# Patient Record
Sex: FEMALE | Race: WHITE | NOT HISPANIC OR LATINO | Employment: OTHER | URBAN - METROPOLITAN AREA
[De-identification: names, ages, dates, MRNs, and addresses within clinical notes are randomized per-mention and may not be internally consistent; named-entity substitution may affect disease eponyms.]

---

## 2024-08-28 ENCOUNTER — TRANSCRIBE ORDERS (OUTPATIENT)
Dept: FAMILY MEDICINE | Facility: CLINIC | Age: 74
End: 2024-08-28
Payer: COMMERCIAL

## 2024-08-28 DIAGNOSIS — S46.012D TRAUMATIC TEAR OF LEFT ROTATOR CUFF, UNSPECIFIED TEAR EXTENT, SUBSEQUENT ENCOUNTER: Primary | ICD-10-CM

## 2024-08-30 ENCOUNTER — THERAPY VISIT (OUTPATIENT)
Dept: PHYSICAL THERAPY | Facility: CLINIC | Age: 74
End: 2024-08-30
Payer: COMMERCIAL

## 2024-08-30 DIAGNOSIS — S46.012A TRAUMATIC COMPLETE TEAR OF LEFT ROTATOR CUFF, INITIAL ENCOUNTER: Primary | ICD-10-CM

## 2024-08-30 PROCEDURE — 97161 PT EVAL LOW COMPLEX 20 MIN: CPT | Mod: GP | Performed by: PHYSICAL THERAPIST

## 2024-08-30 PROCEDURE — 97110 THERAPEUTIC EXERCISES: CPT | Mod: GP | Performed by: PHYSICAL THERAPIST

## 2024-08-30 ASSESSMENT — ACTIVITIES OF DAILY LIVING (ADL)
REACHING_FOR_SOMETHING_ON_A_HIGH_SHELF: 5
AT_ITS_WORST?: 5
TOUCHING_THE_BACK_OF_YOUR_NECK: 3
PLEASE_INDICATE_YOR_PRIMARY_REASON_FOR_REFERRAL_TO_THERAPY:: SHOULDER
WHEN_LYING_ON_THE_INVOLVED_SIDE: 2

## 2024-08-30 NOTE — PROGRESS NOTES
PHYSICAL THERAPY EVALUATION  Type of Visit: Evaluation        Fall Risk Screen:  Fall screen completed by: PT  Have you fallen 2 or more times in the past year?: No  Have you fallen and had an injury in the past year?: No  Is patient a fall risk?: No    Subjective       Presenting condition or subjective complaint: arm pain  Pt fell at her dionicio's home on slippery floor  on 8/26/24 and she landed on her L shoulder. No fracture per xrays, MD suspicious of RCT. Referred to PT.   Date of onset: 08/26/24    Relevant medical history: Hearing problems; Heart problems; High blood pressure; Vision problems   Dates & types of surgery: cataract surgery heart stents stapedectomy    Prior diagnostic imaging/testing results:       Prior therapy history for the same diagnosis, illness or injury: No      Prior Level of Function  Transfers:   Ambulation:   ADL:   IADL:     Living Environment  Social support: With a significant other or spouse   Type of home: House   Stairs to enter the home:         Ramp: No   Stairs inside the home: Yes       Help at home: None  Equipment owned:       Employment: Not Applicable    Hobbies/Interests:      Patient goals for therapy: lift arm in mid range easier    Pain assessment: See objective evaluation for additional pain details     Objective   SHOULDER EVALUATION  PAIN: Pain Level at Rest: 0/10  Pain Level with Use: 5/10  Pain Location: lateral upper L arm  Pain Quality: Aching and Sharp  Pain Frequency: intermittent  Pain is Worst: depends on activity  Pain is Exacerbated By: reaching overhead, reaching out to the side  Pain is Relieved By: rest  Pain Progression: Improved  INTEGUMENTARY (edema, incisions):   POSTURE:   GAIT:   Weightbearing Status:   Assistive Device(s):   Gait Deviations:   BALANCE/PROPRIOCEPTION:   WEIGHTBEARING ALIGNMENT:   ROM:   (Degrees) Left AROM Left PROM Right AROM  Right PROM   Shoulder Flexion WNL with PDM      Shoulder Extension       Shoulder Abduction WNL with  PDM      Shoulder Adduction       Shoulder Internal Rotation       Shoulder External Rotation  83  90   Shoulder Horizontal Abduction       Shoulder Horizontal Adduction       Shoulder Flexion ER T4  T4    Shoulder Flexion IR T8  T9    Elbow Extension       Elbow Flexion       Pain:   End feel:     STRENGTH:   Pain: - none + mild ++ moderate +++ severe  Strength Scale: 0-5/5 Left Right   Shoulder Flexion 5 5   Shoulder Extension     Shoulder Abduction 5- 5   Shoulder Adduction     Shoulder Internal Rotation 5 5   Shoulder External Rotation 4+, + (mild) 5   Shoulder Horizontal Abduction     Shoulder Horizontal Adduction     Elbow Flexion 5-, + (mild) 5   Elbow Extension 5 5   Mid Trap     Lower Trap     Rhomboid     Serratus Anterior       FLEXIBILITY:   SPECIAL TESTS:   PALPATION:   JOINT MOBILITY:   CERVICAL SCREEN:     Assessment & Plan   CLINICAL IMPRESSIONS  Medical Diagnosis: traumatic tear of rotater cuff    Treatment Diagnosis: L shoulder/RC strain   Impression/Assessment: Patient is a 74 year old female with L shoulder complaints.  The following significant findings have been identified: Pain, Decreased ROM/flexibility, Decreased joint mobility, Decreased strength, Inflammation, and Decreased activity tolerance. These impairments interfere with their ability to perform self care tasks, recreational activities, and household chores as compared to previous level of function.     Clinical Decision Making (Complexity):  Clinical Presentation: Evolving/Changing  Clinical Presentation Rationale: based on medical and personal factors listed in PT evaluation  Clinical Decision Making (Complexity): Low complexity    PLAN OF CARE  Treatment Interventions:  Interventions: Manual Therapy, Neuromuscular Re-education, Therapeutic Activity, Therapeutic Exercise    Long Term Goals     PT Goal 1  Goal Identifier: reaching  Goal Description: Pt will be able to reach/lift with 5 lbs overhead without increased pain  Rationale:  to maximize safety and independence with performance of ADLs and functional tasks;to maximize safety and independence within the home  Target Date: 10/25/24      Frequency of Treatment: 1x/wk  Duration of Treatment: 8 wks    Recommended Referrals to Other Professionals:   Education Assessment:        Risks and benefits of evaluation/treatment have been explained.   Patient/Family/caregiver agrees with Plan of Care.     Evaluation Time:     PT Eval, Low Complexity Minutes (83651): 18       Signing Clinician: Chris Calderon PT        [facility]                                                                                   OUTPATIENT PHYSICAL THERAPY      PLAN OF TREATMENT FOR OUTPATIENT REHABILITATION   Patient's Last Name, First Name, Amada Holbrook YOB: 1950   Provider's Name   [facility]   Medical Record No.  [id number]     Onset Date: 08/26/24  Start of Care Date: 08/30/24     Medical Diagnosis:  traumatic tear of rotater cuff      PT Treatment Diagnosis:  L shoulder/RC strain Plan of Treatment  Frequency/Duration: 1x/wk/ 8 wks    Certification date from 08/30/24 to 10/25/24         See note for plan of treatment details and functional goals     Chirs Calderon, PT                         I CERTIFY THE NEED FOR THESE SERVICES FURNISHED UNDER        THIS PLAN OF TREATMENT AND WHILE UNDER MY CARE .             Physician Signature               Date    X_____________________________________________________                  Referring Provider:  Christopher Joyce    Initial Assessment  See Epic Evaluation- Start of Care Date: 08/30/24

## 2024-09-06 ENCOUNTER — THERAPY VISIT (OUTPATIENT)
Dept: PHYSICAL THERAPY | Facility: CLINIC | Age: 74
End: 2024-09-06
Payer: COMMERCIAL

## 2024-09-06 DIAGNOSIS — S46.012A TRAUMATIC COMPLETE TEAR OF LEFT ROTATOR CUFF, INITIAL ENCOUNTER: Primary | ICD-10-CM

## 2024-09-06 PROCEDURE — 97110 THERAPEUTIC EXERCISES: CPT | Mod: GP | Performed by: PHYSICAL THERAPIST

## 2024-09-12 ENCOUNTER — THERAPY VISIT (OUTPATIENT)
Dept: PHYSICAL THERAPY | Facility: CLINIC | Age: 74
End: 2024-09-12
Payer: COMMERCIAL

## 2024-09-12 DIAGNOSIS — S46.012D TRAUMATIC COMPLETE TEAR OF LEFT ROTATOR CUFF, SUBSEQUENT ENCOUNTER: Primary | ICD-10-CM

## 2024-09-12 PROCEDURE — 97140 MANUAL THERAPY 1/> REGIONS: CPT | Mod: GP | Performed by: PHYSICAL THERAPIST

## 2024-09-12 PROCEDURE — 97110 THERAPEUTIC EXERCISES: CPT | Mod: GP | Performed by: PHYSICAL THERAPIST

## 2024-09-19 ENCOUNTER — THERAPY VISIT (OUTPATIENT)
Dept: PHYSICAL THERAPY | Facility: CLINIC | Age: 74
End: 2024-09-19
Payer: COMMERCIAL

## 2024-09-19 DIAGNOSIS — S46.012D TRAUMATIC COMPLETE TEAR OF LEFT ROTATOR CUFF, SUBSEQUENT ENCOUNTER: Primary | ICD-10-CM

## 2024-09-19 PROCEDURE — 97140 MANUAL THERAPY 1/> REGIONS: CPT | Mod: GP | Performed by: PHYSICAL THERAPIST

## 2024-09-19 PROCEDURE — 97110 THERAPEUTIC EXERCISES: CPT | Mod: GP | Performed by: PHYSICAL THERAPIST

## 2024-09-26 ENCOUNTER — THERAPY VISIT (OUTPATIENT)
Dept: PHYSICAL THERAPY | Facility: CLINIC | Age: 74
End: 2024-09-26
Payer: COMMERCIAL

## 2024-09-26 DIAGNOSIS — S46.012D TRAUMATIC COMPLETE TEAR OF LEFT ROTATOR CUFF, SUBSEQUENT ENCOUNTER: Primary | ICD-10-CM

## 2024-09-26 PROBLEM — S46.012A TRAUMATIC COMPLETE TEAR OF LEFT ROTATOR CUFF: Status: RESOLVED | Noted: 2024-08-30 | Resolved: 2024-09-26

## 2024-09-26 PROCEDURE — 97140 MANUAL THERAPY 1/> REGIONS: CPT | Mod: GP | Performed by: PHYSICAL THERAPIST

## 2024-09-26 PROCEDURE — 97110 THERAPEUTIC EXERCISES: CPT | Mod: GP | Performed by: PHYSICAL THERAPIST

## 2024-09-26 NOTE — PROGRESS NOTES
"    DISCHARGE  Reason for Discharge: Patient has met all goals.    Equipment Issued:     Discharge Plan: Patient to continue home program.   09/26/24 0500   Appointment Info   Signing clinician's name / credentials Chris Calderon DPT   Total/Authorized Visits 8   Visits Used 5   Medical Diagnosis traumatic tear of rotater cuff   PT Tx Diagnosis L shoulder/RC strain   Progress Note/Certification   Start of Care Date 08/30/24   Onset of illness/injury or Date of Surgery 08/26/24   Therapy Frequency 1x/wk   Predicted Duration 8 wks   Certification date from 08/30/24   Certification date to 10/25/24   Progress Note Completed Date 08/30/24   PT Goal 1   Goal Identifier reaching   Goal Description Pt will be able to reach/lift with 5 lbs overhead without increased pain   Rationale to maximize safety and independence with performance of ADLs and functional tasks;to maximize safety and independence within the home   Goal Progress still mild pain 1-2/10 with overhead reaching as of 9/19   Target Date 10/25/24   Subjective Report   Subjective Report Still doing well, denies any restrictions in her daily life. 85% or slightly more improved. Feels ready to self manage   Objective Measures   Objective Measures Objective Measure 1;Objective Measure 2   Objective Measure 1   Objective Measure L shoulder ROM   Details ER PROM 84   Objective Measure 2   Objective Measure strength   Details WNL B with mild pain on L abduction   Treatment Interventions (PT)   Interventions Manual Therapy;Therapeutic Procedure/Exercise;Neuromuscular Re-education;Therapeutic Activity   Therapeutic Procedure/Exercise   Therapeutic Procedures: strength, endurance, ROM, flexibility minutes (55278) 30   Therapeutic Procedures Ther Proc 5   Ther Proc 1 UBE warm up   Ther Proc 1 - Details 3'   Ther Proc 5 L shoulder ER PROM   Ther Proc 5 - Details supine 3' (\"feels good\")   PTRx Ther Proc 1 Wand Shoulder External Rotation in Neutral   PTRx Ther Proc 1 - " "Details x15 required additional cues to keep elbow at 90 deg   PTRx Ther Proc 2 [place] Stretch External Rotation at Side   PTRx Ther Proc 2 - Details 10 x 10\" reminder to use towel under arm   PTRx Ther Proc 3 Wand Shoulder Abduction Standing   PTRx Ther Proc 3 - Details x10 for review of technique   PTRx Ther Proc 4 Shoulder Scaption Full Can   PTRx Ther Proc 4 - Details 1 lb x 30   PTRx Ther Proc 5 Shoulder Theraband Rows   PTRx Ther Proc 5 - Details BTB x 30   PTRx Ther Proc 6 Shoulder External Rotation Sidelying   PTRx Ther Proc 6 - Details 8 oz x 30   PTRx Ther Proc 7 Shoulder Extension in Standing   PTRx Ther Proc 7 - Details 1 lb x 30   Patient Response/Progress see above   Therapeutic Activity   Patient Response/Progress see above   Neuromuscular Re-education   PTRx Neuro Re-ed 1 Scapular Retraction/Depression   PTRx Neuro Re-ed 1 - Details HEP   Patient Response/Progress see above   Manual Therapy   Manual Therapy: Mobilization, MFR, MLD, friction massage minutes (86391) 8   Manual Therapy 1 L shoulder oscillations with gentle inf/post mobs   Manual Therapy 1 - Details Gr III x 8'   Patient Response/Progress \"feels good\"   Plan   Home program see PTRx   Updates to plan of care progressed resistance on exercise   Plan for next session d/c to HEP   Total Session Time   Timed Code Treatment Minutes 38   Total Treatment Time (sum of timed and untimed services) 38         Referring Provider:  Christopher Joyce    "